# Patient Record
Sex: FEMALE | Race: ASIAN | NOT HISPANIC OR LATINO | URBAN - METROPOLITAN AREA
[De-identification: names, ages, dates, MRNs, and addresses within clinical notes are randomized per-mention and may not be internally consistent; named-entity substitution may affect disease eponyms.]

---

## 2019-06-29 ENCOUNTER — EMERGENCY (EMERGENCY)
Facility: HOSPITAL | Age: 49
LOS: 1 days | Discharge: HOME | End: 2019-06-29
Attending: EMERGENCY MEDICINE
Payer: MEDICAID

## 2019-06-29 VITALS
SYSTOLIC BLOOD PRESSURE: 145 MMHG | HEART RATE: 68 BPM | TEMPERATURE: 98 F | RESPIRATION RATE: 20 BRPM | DIASTOLIC BLOOD PRESSURE: 76 MMHG | OXYGEN SATURATION: 99 %

## 2019-06-29 VITALS
SYSTOLIC BLOOD PRESSURE: 153 MMHG | HEART RATE: 78 BPM | DIASTOLIC BLOOD PRESSURE: 79 MMHG | RESPIRATION RATE: 16 BRPM | WEIGHT: 119.93 LBS | HEIGHT: 62 IN | TEMPERATURE: 98 F

## 2019-06-29 PROCEDURE — 71046 X-RAY EXAM CHEST 2 VIEWS: CPT

## 2019-06-29 PROCEDURE — 73030 X-RAY EXAM OF SHOULDER: CPT

## 2019-06-29 PROCEDURE — 99284 EMERGENCY DEPT VISIT MOD MDM: CPT

## 2019-06-29 PROCEDURE — 99283 EMERGENCY DEPT VISIT LOW MDM: CPT

## 2019-06-29 PROCEDURE — 73030 X-RAY EXAM OF SHOULDER: CPT | Mod: 26,LT

## 2019-06-29 PROCEDURE — 71046 X-RAY EXAM CHEST 2 VIEWS: CPT | Mod: 26

## 2019-06-29 RX ORDER — LIDOCAINE 4 G/100G
1 CREAM TOPICAL ONCE
Refills: 0 | Status: COMPLETED | OUTPATIENT
Start: 2019-06-29 | End: 2019-06-29

## 2019-06-29 RX ORDER — ACETAMINOPHEN 500 MG
975 TABLET ORAL ONCE
Refills: 0 | Status: COMPLETED | OUTPATIENT
Start: 2019-06-29 | End: 2019-06-29

## 2019-06-29 RX ORDER — IBUPROFEN 200 MG
600 TABLET ORAL ONCE
Refills: 0 | Status: COMPLETED | OUTPATIENT
Start: 2019-06-29 | End: 2019-06-29

## 2019-06-29 RX ADMIN — LIDOCAINE 1 PATCH: 4 CREAM TOPICAL at 21:12

## 2019-06-29 RX ADMIN — Medication 600 MILLIGRAM(S): at 21:13

## 2019-06-29 RX ADMIN — Medication 975 MILLIGRAM(S): at 21:13

## 2019-06-29 NOTE — ED PROVIDER NOTE - CARE PLAN
Principal Discharge DX:	Left shoulder pain  Secondary Diagnosis:	Blunt injury to chest, initial encounter

## 2019-06-29 NOTE — ED ADULT NURSE NOTE - OBJECTIVE STATEMENT
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 pain, n/v/d, numbness/tingling at present. no bruising/deformity noted at present. pt has full ROM in all extremities.

## 2019-06-29 NOTE — ED PROVIDER NOTE - OBJECTIVE STATEMENT
48 year-old female p/w 48 year-old female p/w left shoulder pain for 5 hours PTA.  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 48 year-old female p/w left shoulder pain for 5 hours PTA.  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    Dr. Lebron Note: 48F sudden onset L shoulder pain and chest wall pain after blunt injury fall from sitting, worse with movement, better with rest, no assoc numbness, tingling, or bleeding, onset 5hrs PTA. 48 year-old female p/w left shoulder pain for 5 hours PTA.  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  No head trauma/LOC.  No fevers, chills, nausea, vomiting, chest pain, shortness of breath, UE weakness.  Did not take any medications PTA.    Dr. Lebron Note: 48F sudden onset L shoulder pain and chest wall pain after blunt injury fall from sitting, worse with movement, better with rest, no assoc numbness, tingling, or bleeding, onset 5hrs PTA.

## 2019-06-29 NOTE — ED PROVIDER NOTE - PHYSICAL EXAMINATION
*Gen: NAD, AAO*3  *HEENT: NC/AT, MMM, airway patent, trachea midline  *CV: RRR, S1/S2 present, no murmurs/rubs  *Resp: no respiratory distress, LCTAB, no wheezing/rales  *Abd: non-distended, soft N/Tx4, no guarding or rigidity  *Neuro: no focal neuro deficits, moving all limbs appropriately  *Extremities: no gross deformity, pain with ROM of LUE, neurovascularly intact, left-sided midaxillary TTP  *Skin: no rashes, no wounds   ~ Wolf Dawn M.D. *Gen: NAD, AAO*3  *HEENT: NC/AT, MMM, airway patent, trachea midline  *CV: RRR, S1/S2 present, no murmurs/rubs  *Resp: no respiratory distress, LCTAB, no wheezing/rales  *Abd: non-distended, soft N/Tx4, no guarding or rigidity  *Neuro: no focal neuro deficits, moving all limbs appropriately  *Extremities: no gross deformity, pain with ROM of LUE, neurovascularly intact, left-sided midaxillary TTP  *Skin: no rashes, no wounds   ~ Wolf Lebron Note: mild soft tissue tenderness of L lateral shoulder and L chest wall.  FROM of shoulder, NV intact distal, no bruising.

## 2019-06-29 NOTE — ED PROVIDER NOTE - NSFOLLOWUPINSTRUCTIONS_ED_ALL_ED_FT
Follow-up with your Primary Care Physician within 2 - 5 days.  Please return to the Emergency Department immediately for any new, worsening or concerning symptoms.    Can use Tylenol or Ibuprofen as per package directions for pain - use only as needed.  These are over-the-counter medications - please respect the warnings on the label.

## 2023-07-24 NOTE — ED ADULT NURSE NOTE - CAS DISCH BELONGINGS RETURNED
Subjective   Patient ID: [id number]     Tracy Iglesias is a 44 y.o. female who presents for Follow-up and Med Refill.  HPI  She is here for a refill on medications.  She is on sertraline, omeprazole and atorvastatin.  She denies side effects to the medication.  States the sertraline helps her with depression.  She has not eaten today.    She has not had a mammogram in the last year.      Review of Systems   Constitutional:  Negative for fatigue and fever.   HENT:  Negative for rhinorrhea, sinus pressure, sore throat and voice change.    Respiratory:  Negative for cough, shortness of breath and wheezing.    Cardiovascular:  Negative for chest pain, palpitations and leg swelling.   Gastrointestinal:  Negative for abdominal pain, blood in stool, constipation, diarrhea, nausea and vomiting.   Genitourinary:  Negative for dysuria, frequency, hematuria and vaginal bleeding.   Musculoskeletal:  Negative for arthralgias, back pain and myalgias.   Skin:  Negative for rash and wound.        No moles growing or changing.   Neurological:  Negative for dizziness, syncope, weakness, numbness and headaches.   Hematological:  Negative for adenopathy.   Psychiatric/Behavioral:  Negative for dysphoric mood and sleep disturbance. The patient is not nervous/anxious.         Depression is well controlled on the medication.  Denies suicidal ideation.  No anhedonia.  No sleeping problems.       Quit smoking 2021.  Denies drinking alcohol.  Denies drug use.    She spends most of her time taking care of her mother in law and is busy with that.      Objective     /70 (BP Location: Right arm, Patient Position: Sitting)   Temp 36.9 °C (98.4 °F)   Wt 99.8 kg (220 lb)   BMI 34.46 kg/m²      Physical Exam  Vitals reviewed.   Constitutional:       General: She is not in acute distress.     Appearance: Normal appearance. She is not ill-appearing or toxic-appearing.   HENT:      Head: Normocephalic and atraumatic.      Right Ear: Tympanic  membrane, ear canal and external ear normal.      Left Ear: Tympanic membrane, ear canal and external ear normal.      Nose: Nose normal.      Mouth/Throat:      Pharynx: No oropharyngeal exudate or posterior oropharyngeal erythema.      Comments: Pharynx asymmetrical.  She states she has been told of this since she was a teenager.  Eyes:      Extraocular Movements: Extraocular movements intact.      Conjunctiva/sclera: Conjunctivae normal.      Pupils: Pupils are equal, round, and reactive to light.   Cardiovascular:      Rate and Rhythm: Normal rate and regular rhythm.      Heart sounds: No murmur heard.  Pulmonary:      Effort: Pulmonary effort is normal.      Breath sounds: Normal breath sounds.   Abdominal:      General: Bowel sounds are normal. There is no distension.      Palpations: Abdomen is soft. There is no mass.      Tenderness: There is no abdominal tenderness. There is no guarding or rebound.   Musculoskeletal:         General: No tenderness.      Cervical back: Neck supple. No rigidity or tenderness.      Right lower leg: No edema.      Left lower leg: No edema.   Lymphadenopathy:      Cervical: No cervical adenopathy.   Skin:     Coloration: Skin is not jaundiced or pale.      Findings: No rash.   Neurological:      General: No focal deficit present.      Mental Status: She is alert and oriented to person, place, and time. Mental status is at baseline.   Psychiatric:         Mood and Affect: Mood normal.         Behavior: Behavior normal.         Thought Content: Thought content normal.         Judgment: Judgment normal.         Assessment/Plan   Problem List Items Addressed This Visit    None  Visit Diagnoses       Recurrent major depressive disorder, in full remission (CMS/[facility])    -  Primary    Hyperlipidemia, unspecified hyperlipidemia type        Relevant Orders    CBC and Auto Differential    Hemoglobin A1C    Comprehensive Metabolic Panel    Lipid Panel    Thyroid Stimulating Hormone     Gastroesophageal reflux disease, unspecified whether esophagitis present        Depression, unspecified        Relevant Medications    sertraline (Zoloft) 25 mg tablet    Right upper quadrant pain        Relevant Medications    omeprazole (PriLOSEC) 40 mg DR capsule    Hyperlipidemia, unspecified        Relevant Medications    atorvastatin (Lipitor) 10 mg tablet    Other Relevant Orders    CBC and Auto Differential    Hemoglobin A1C    Comprehensive Metabolic Panel    Lipid Panel    Thyroid Stimulating Hormone    Screening mammogram for breast cancer        Relevant Orders    BI mammo bilateral screening tomosynthesis          I will refill your medication and order labs.  I ordered a screening mammogram.  It is excellent that you stopped smoking in 2021.  Do not go back to smoking.  Weight loss would benefit your overall health.  Return in six months for a recheck.    Rasheed Gtz, DO    Not applicable